# Patient Record
Sex: FEMALE | Race: WHITE
[De-identification: names, ages, dates, MRNs, and addresses within clinical notes are randomized per-mention and may not be internally consistent; named-entity substitution may affect disease eponyms.]

---

## 2018-06-12 ENCOUNTER — HOSPITAL ENCOUNTER (OUTPATIENT)
Dept: HOSPITAL 62 - OD | Age: 58
End: 2018-06-12
Attending: FAMILY MEDICINE
Payer: COMMERCIAL

## 2018-06-12 DIAGNOSIS — M54.9: ICD-10-CM

## 2018-06-12 DIAGNOSIS — M25.552: ICD-10-CM

## 2018-06-12 DIAGNOSIS — M25.511: Primary | ICD-10-CM

## 2018-06-12 DIAGNOSIS — M16.12: ICD-10-CM

## 2018-06-12 DIAGNOSIS — M51.36: ICD-10-CM

## 2018-06-12 PROCEDURE — 72110 X-RAY EXAM L-2 SPINE 4/>VWS: CPT

## 2018-06-12 PROCEDURE — 72070 X-RAY EXAM THORAC SPINE 2VWS: CPT

## 2018-06-12 NOTE — RADIOLOGY REPORT (SQ)
EXAM DESCRIPTION:  LUMBAR SPINE COMPLETE



COMPLETED DATE/TIME:  6/12/2018 4:36 pm



REASON FOR STUDY:  DORSALGIA, UNSPECIFIED M25.552  PAIN IN LEFT HIP M25.511  PAIN IN RIGHT SHOULDER M
54.9  DORSALGIA, UNSPECIFIED



COMPARISON:  None.



NUMBER OF VIEWS:  Five views including obliques.



TECHNIQUE:  AP, lateral, oblique, and sacral radiographic images acquired of the lumbar spine.



LIMITATIONS:  None.



FINDINGS:  MINERALIZATION: Normal.

SEGMENTATION: Normal.  No transitional anatomy.

ALIGNMENT: Normal.

VERTEBRAE: Maintained height.  No fracture or worrisome bone lesion.

DISCS: Moderate to severe disc degeneration L4-L5.  Mild to moderate disc space narrowing L5-S1.

POSTERIOR ELEMENTS: Pedicles and facets are intact.  No pars defect or posterior arch defects.

HARDWARE: None in the spine.

PARASPINAL SOFT TISSUES: Normal.

PELVIS: Intact as visualized. No fractures or worrisome bone lesions. SI joints intact.

OTHER: No other significant finding.



IMPRESSION:  Moderate to severe disc degeneration L4-L5.  Mild to moderate disc space narrowing L5-S1




TECHNICAL DOCUMENTATION:  JOB ID:  [id number]

 [employer]- All Rights Reserved



Reading location - IP/workstation name: [email or web address]

## 2018-06-12 NOTE — RADIOLOGY REPORT (SQ)
EXAM DESCRIPTION:  SHOULDER RIGHT 2 OR MORE VIEWS



COMPLETED DATE/TIME:  6/12/2018 4:36 pm



REASON FOR STUDY:  PAIN IN RIGHT SHOULDER M25.552  PAIN IN LEFT HIP M25.511  PAIN IN RIGHT SHOULDER M
54.9  DORSALGIA, UNSPECIFIED



COMPARISON:  None.



NUMBER OF VIEWS:  Three views.



TECHNIQUE:  Internal rotation, external rotation, and Y view images acquired of the right shoulder.



LIMITATIONS:  None.



FINDINGS:  MINERALIZATION: Normal.

BONES: No acute fracture or dislocation.  No worrisome bone lesions.

JOINTS: No dislocation.

VISUALIZED LUNGS AND RIBS: No pneumothorax.  No rib fracture.

SOFT TISSUES: Small calcific densities biceps tendon.

OTHER: No other significant finding.



IMPRESSION:  No significant bony pathology.  Minimal calcification biceps tendon.



TECHNICAL DOCUMENTATION:  JOB ID:  [id number]

 2011 [employer]- All Rights Reserved



Reading location - IP/workstation name: [email or web address]

## 2018-06-12 NOTE — RADIOLOGY REPORT (SQ)
EXAM DESCRIPTION:  HIP LEFT AP/LATERAL



COMPLETED DATE/TIME:  6/12/2018 4:36 pm



REASON FOR STUDY:  PAIN IN LEFT HIP M25.552  PAIN IN LEFT HIP M25.511  PAIN IN RIGHT SHOULDER M54.9  
DORSALGIA, UNSPECIFIED



COMPARISON:  None.



NUMBER OF VIEWS:  Two views.



TECHNIQUE:  AP pelvis and additional frog-leg view of the left hip.



LIMITATIONS:  None.



FINDINGS:  MINERALIZATION: Normal.

LEFT HIP: No fracture or dislocation.  Medial joint space narrowing.  Prominent marginal osteophytes 
on the femoral head.

RIGHT HIP: No fracture or dislocation.  No worrisome bone lesions.

PUBIS AND ISCHIUM: No fracture.

PELVIS: No fracture.

SACRUM: No fracture or dislocation. No worrisome bone lesions.

LOWER LUMBAR SPINE: Lower lumbar degenerative disc changes.

SOFT TISSUES: No findings.

OTHER: No other significant finding.



IMPRESSION:  Degenerative joint disease in the left hip.  Lower lumbar degenerative disc changes.



TECHNICAL DOCUMENTATION:  JOB ID:  [id number]

 2011 [employer]- All Rights Reserved



Reading location - IP/workstation name: [email or web address]